# Patient Record
Sex: FEMALE | Race: WHITE | NOT HISPANIC OR LATINO | Employment: PART TIME | URBAN - METROPOLITAN AREA
[De-identification: names, ages, dates, MRNs, and addresses within clinical notes are randomized per-mention and may not be internally consistent; named-entity substitution may affect disease eponyms.]

---

## 2019-05-24 ENCOUNTER — OFFICE VISIT (OUTPATIENT)
Dept: FAMILY MEDICINE | Facility: CLINIC | Age: 39
End: 2019-05-24
Payer: COMMERCIAL

## 2019-05-24 VITALS
OXYGEN SATURATION: 97 % | RESPIRATION RATE: 17 BRPM | HEIGHT: 62 IN | SYSTOLIC BLOOD PRESSURE: 110 MMHG | WEIGHT: 132.06 LBS | DIASTOLIC BLOOD PRESSURE: 78 MMHG | HEART RATE: 83 BPM | TEMPERATURE: 98 F | BODY MASS INDEX: 24.3 KG/M2

## 2019-05-24 DIAGNOSIS — Z13.220 SCREENING FOR LIPID DISORDERS: ICD-10-CM

## 2019-05-24 DIAGNOSIS — Z23 NEED FOR VACCINATION: Primary | ICD-10-CM

## 2019-05-24 LAB
B-HCG UR QL: NEGATIVE
CTP QC/QA: YES

## 2019-05-24 PROCEDURE — 90707 MMR VACCINE SQ: ICD-10-PCS | Mod: S$GLB,,, | Performed by: FAMILY MEDICINE

## 2019-05-24 PROCEDURE — 90471 TDAP VACCINE GREATER THAN OR EQUAL TO 7YO IM: ICD-10-PCS | Mod: S$GLB,,, | Performed by: FAMILY MEDICINE

## 2019-05-24 PROCEDURE — 81025 URINE PREGNANCY TEST: CPT | Mod: 59,S$GLB,, | Performed by: FAMILY MEDICINE

## 2019-05-24 PROCEDURE — 90472 IMMUNIZATION ADMIN EACH ADD: CPT | Mod: S$GLB,,, | Performed by: FAMILY MEDICINE

## 2019-05-24 PROCEDURE — 81025 POCT URINE PREGNANCY: ICD-10-PCS | Mod: 59,S$GLB,, | Performed by: FAMILY MEDICINE

## 2019-05-24 PROCEDURE — 99999 PR PBB SHADOW E&M-NEW PATIENT-LVL IV: ICD-10-PCS | Mod: PBBFAC,,, | Performed by: FAMILY MEDICINE

## 2019-05-24 PROCEDURE — 3008F BODY MASS INDEX DOCD: CPT | Mod: CPTII,S$GLB,, | Performed by: FAMILY MEDICINE

## 2019-05-24 PROCEDURE — 90471 IMMUNIZATION ADMIN: CPT | Mod: S$GLB,,, | Performed by: FAMILY MEDICINE

## 2019-05-24 PROCEDURE — 90472 MMR VACCINE SQ: ICD-10-PCS | Mod: S$GLB,,, | Performed by: FAMILY MEDICINE

## 2019-05-24 PROCEDURE — 90707 MMR VACCINE SC: CPT | Mod: S$GLB,,, | Performed by: FAMILY MEDICINE

## 2019-05-24 PROCEDURE — 99202 OFFICE O/P NEW SF 15 MIN: CPT | Mod: 25,S$GLB,, | Performed by: FAMILY MEDICINE

## 2019-05-24 PROCEDURE — 99202 PR OFFICE/OUTPT VISIT, NEW, LEVL II, 15-29 MIN: ICD-10-PCS | Mod: 25,S$GLB,, | Performed by: FAMILY MEDICINE

## 2019-05-24 PROCEDURE — 90715 TDAP VACCINE 7 YRS/> IM: CPT | Mod: S$GLB,,, | Performed by: FAMILY MEDICINE

## 2019-05-24 PROCEDURE — 3008F PR BODY MASS INDEX (BMI) DOCUMENTED: ICD-10-PCS | Mod: CPTII,S$GLB,, | Performed by: FAMILY MEDICINE

## 2019-05-24 PROCEDURE — 99999 PR PBB SHADOW E&M-NEW PATIENT-LVL IV: CPT | Mod: PBBFAC,,, | Performed by: FAMILY MEDICINE

## 2019-05-24 PROCEDURE — 90715 TDAP VACCINE GREATER THAN OR EQUAL TO 7YO IM: ICD-10-PCS | Mod: S$GLB,,, | Performed by: FAMILY MEDICINE

## 2019-05-24 NOTE — PROGRESS NOTES
Routine Office Visit    Patient Name: Selene Jimenez    : 1980  MRN: [id number]    Subjective:  Selene is a 38 y.o. female who presents today for:   Chief Complaint   Patient presents with    Immunizations     38-year-old female comes in after spoke to her  today and he received his MMR vaccine as they will be traveling to [place] which currently has a measles outbreak.  She would like to get her measles vaccine done as well.  She is not sure when the last time she had vaccinations done.  She states that she thinks she was a teenager.  She reports no acute concerns.    Past Medical History  History reviewed. No pertinent past medical history.    Past Surgical History  Past Surgical History:   Procedure Laterality Date     SECTION          Family History  Family History   Problem Relation Age of Onset    Colon cancer Neg Hx     Ovarian cancer Neg Hx     Breast cancer Maternal Aunt        Social History  Social History     Socioeconomic History    Marital status:      Spouse name: Not on file    Number of children: Not on file    Years of education: Not on file    Highest education level: Not on file   Occupational History    Not on file   Social Needs    Financial resource strain: Not on file    Food insecurity:     Worry: Not on file     Inability: Not on file    Transportation needs:     Medical: Not on file     Non-medical: Not on file   Tobacco Use    Smoking status: Never Smoker   Substance and Sexual Activity    Alcohol use: Not on file    Drug use: Not on file    Sexual activity: Not on file   Lifestyle    Physical activity:     Days per week: Not on file     Minutes per session: Not on file    Stress: Not on file   Relationships    Social connections:     Talks on phone: Not on file     Gets together: Not on file     Attends [employer]ical service: Not on file     Active member of club or organization: Not on file     Attends meetings of clubs or organizations: Not  "on file     Relationship status: Not on file   Other Topics Concern    Not on file   Social History Narrative    Not on file       Current Medications  No current outpatient medications on file prior to visit.     No current facility-administered medications on file prior to visit.        Allergies   Review of patient's allergies indicates:  No Known Allergies    Review of Systems   Constitutional: Negative for chills, diaphoresis, fatigue and fever.   Respiratory: Negative for shortness of breath and wheezing.    Cardiovascular: Negative for chest pain.   Genitourinary: Negative for dysuria.     /78 (BP Location: Left arm, Patient Position: Sitting, BP Method: Small (Manual))   Pulse 83   Temp 97.7 °F (36.5 °C) (Oral)   Resp 17   Ht 5' 2" (1.575 m)   Wt 59.9 kg (132 lb 0.9 oz)   SpO2 97%   BMI 24.15 kg/m²     Physical Exam   Constitutional: She appears well-developed. No distress.   HENT:   Head: Normocephalic and atraumatic.   Right Ear: External ear normal.   Left Ear: External ear normal.   Eyes: Pupils are equal, round, and reactive to light. EOM are normal.   Neck: Normal range of motion. Neck supple.   Cardiovascular: Normal rate, regular rhythm, normal heart sounds and intact distal pulses.   Pulmonary/Chest: Effort normal and breath sounds normal. She has no wheezes.     Assessment/Plan:  Selene was seen today for immunizations.    Diagnoses and all orders for this visit:    Need for vaccination  -     (In Office Administered) Tdap Vaccine  -     (In Office Administered) MMR Vaccine (SQ)  -     POCT Urine Pregnancy  Urine pregnancy done as patient would be receiving MMR vaccine.  Pregnancy test negative.  MMR vaccine ordered.  As patient is also past due for tetanus vaccine Tdap administered as well.    Screening for lipid disorders  -     Lipid panel; Future  Patient due for lipid panel and this was ordered.  She will do fasting.              -Portillo Chand Jr., MD, AAHIVS          This " office note has been dictated.  This dictation has been generated using M-Modal Fluency Direct dictation; some phonetic errors may occur.

## 2019-07-19 DIAGNOSIS — Z12.39 BREAST CANCER SCREENING: ICD-10-CM

## 2020-08-14 DIAGNOSIS — Z11.59 NEED FOR HEPATITIS C SCREENING TEST: ICD-10-CM

## 2020-09-25 DIAGNOSIS — Z12.31 OTHER SCREENING MAMMOGRAM: ICD-10-CM

## 2020-10-05 ENCOUNTER — PATIENT MESSAGE (OUTPATIENT)
Dept: ADMINISTRATIVE | Facility: HOSPITAL | Age: 40
End: 2020-10-05

## 2021-01-04 ENCOUNTER — PATIENT MESSAGE (OUTPATIENT)
Dept: ADMINISTRATIVE | Facility: HOSPITAL | Age: 41
End: 2021-01-04

## 2021-04-06 ENCOUNTER — PATIENT MESSAGE (OUTPATIENT)
Dept: ADMINISTRATIVE | Facility: HOSPITAL | Age: 41
End: 2021-04-06

## 2021-04-15 ENCOUNTER — PATIENT MESSAGE (OUTPATIENT)
Dept: RESEARCH | Facility: HOSPITAL | Age: 41
End: 2021-04-15

## 2021-07-07 ENCOUNTER — PATIENT MESSAGE (OUTPATIENT)
Dept: ADMINISTRATIVE | Facility: HOSPITAL | Age: 41
End: 2021-07-07

## 2021-10-04 ENCOUNTER — PATIENT MESSAGE (OUTPATIENT)
Dept: ADMINISTRATIVE | Facility: HOSPITAL | Age: 41
End: 2021-10-04